# Patient Record
Sex: MALE | Race: WHITE | NOT HISPANIC OR LATINO | Employment: OTHER | URBAN - METROPOLITAN AREA
[De-identification: names, ages, dates, MRNs, and addresses within clinical notes are randomized per-mention and may not be internally consistent; named-entity substitution may affect disease eponyms.]

---

## 2017-01-03 ENCOUNTER — OFFICE VISIT (OUTPATIENT)
Dept: ENDOCRINOLOGY | Facility: CLINIC | Age: 32
End: 2017-01-03
Payer: MEDICARE

## 2017-01-03 VITALS
HEART RATE: 68 BPM | SYSTOLIC BLOOD PRESSURE: 130 MMHG | BODY MASS INDEX: 47.74 KG/M2 | DIASTOLIC BLOOD PRESSURE: 80 MMHG | HEIGHT: 68 IN | WEIGHT: 315 LBS

## 2017-01-03 DIAGNOSIS — E29.1 SECONDARY MALE HYPOGONADISM: Primary | ICD-10-CM

## 2017-01-03 DIAGNOSIS — E03.9 HYPOTHYROIDISM (ACQUIRED): ICD-10-CM

## 2017-01-03 DIAGNOSIS — E66.01 MORBID OBESITY WITH BMI OF 60.0-69.9, ADULT: ICD-10-CM

## 2017-01-03 DIAGNOSIS — G47.33 OBSTRUCTIVE SLEEP APNEA: ICD-10-CM

## 2017-01-03 PROCEDURE — 99204 OFFICE O/P NEW MOD 45 MIN: CPT | Mod: S$PBB,,, | Performed by: INTERNAL MEDICINE

## 2017-01-03 PROCEDURE — 99213 OFFICE O/P EST LOW 20 MIN: CPT | Mod: PBBFAC | Performed by: INTERNAL MEDICINE

## 2017-01-03 PROCEDURE — 99999 PR PBB SHADOW E&M-EST. PATIENT-LVL III: CPT | Mod: PBBFAC,,, | Performed by: INTERNAL MEDICINE

## 2017-01-03 NOTE — LETTER
January 3, 2017      Jose Paulino MD  [address] [place] [address]           [facility]  [address]  [place] [address]  Phone: [phone]  Fax: [phone]          Patient: Eder Carney   MR Number: [id number]   YOB: 1985   Date of Visit: 1/3/2017       Dear Dr. Jose Paulino:    Thank you for referring Eder Carney to me for evaluation. Attached you will find relevant portions of my assessment and plan of care.    If you have questions, please do not hesitate to call me. I look forward to following Eder Carney along with you.    Sincerely,    Bradley Springer MD    Enclosure  CC:  No Recipients    If you would like to receive this communication electronically, please contact [email or web address] or [phone] to request more information on [employer] Link access.    For providers and/or their staff who would like to refer a patient to [facility], please contact us through our one-stop-shop provider referral line, [facility], at [phone].    If you feel you have received this communication in error or would no longer like to receive these types of communications, please e-mail [email or web address]

## 2017-01-03 NOTE — PROGRESS NOTES
Subjective:      Patient ID: Eder Carney is a 31 y.o. male.    Chief Complaint:  Thyroid Problem    Referral from Dr. Jose Paulino    History of Present Illness  Mr presents for evaluation of male hypogonadism.     Has active history of BILLIE, chronic back pain, morbid obesity and hypothyroidism.    Developmental history:  Normal testis descent, puberty onset in early teens, first noticed body and facial hair in teenage years, and reached maximal adult height at age 17-18.    No family history of hypogonadism.  Normal sense of smell.     Denies recent severe/critical illness, no STD's, no orchitis, no testicular trauma or hx of orchiectomy, no radiation exposure.     Denies chronic steroid or opiate use. Denies use of marijuana. Denies use of lavender or tea tree oil. Denies use of anabolic steroids. Does not drink an excessive amount of EtOH.    Mood fluctuates. Has diagnosis of sleep apnea, but unable to tolerate CPAP mask as he can't sleep on his back due to chronic back pain.  Sleeps 4-6 hours a night. Denies excessive daytime sleepiness.    Libido is decreased. Difficult to get and maintain erections.  Doesn't get early AM spontaneous erections. No decrease in shaving frequency. No recent fractures. Denies hot flashes or night sweats.     No children. Not .    Denies breast tenderness or gynecomastia. No nipple discharge.  Occasional headaches. No decrease in peripheral vision.  No nausea/vomiting, weight loss or dizziness/lightheadedness. No history of head trauma.    Weight has increased since spine surgery in 2014, but has recently been stable. Has some increased facial roundness and redness. Has some stretch marks on abdomen.     Review of Systems   Constitutional: Negative for unexpected weight change.   Eyes: Negative for visual disturbance.   Respiratory: Negative for shortness of breath.    Cardiovascular: Negative for chest pain.   Gastrointestinal: Negative for abdominal pain.   Endocrine:  Negative for cold intolerance.   Genitourinary: Positive for frequency.   Skin: Negative for rash.   Neurological: Positive for headaches.       Objective:   Physical Exam   Constitutional: He is oriented to person, place, and time. He appears well-developed and well-nourished.   HENT:   Head: Normocephalic and atraumatic.   Right Ear: External ear normal.   Left Ear: External ear normal.   Nose: Nose normal.   Neck: No tracheal deviation present. No thyromegaly present.   Cardiovascular: Normal rate, regular rhythm and normal heart sounds.    + 1 pedal edema in lower ext   Pulmonary/Chest: Effort normal and breath sounds normal.   Abdominal: Soft. Bowel sounds are normal. There is no tenderness.   Genitourinary:   Genitourinary Comments: Testis 20 cc bilaterally without masses    Musculoskeletal:   Normal gait, no cyanosis or clubbing   Neurological: He is alert and oriented to person, place, and time. He has normal reflexes.   Vibration sense intact   Skin: Skin is warm and dry. No rash noted.   No nodules, no ulcers  Thin pearly striae on abdomen  Some dorsocervical and supraclavicular fullness     Psychiatric: He has a normal mood and affect. Judgment normal.   Vitals reviewed.      Lab Review:   Outside labs reviewed  9/1/2016: Prolactin 16.2 (slightly elevated), LH 3.0, FSH 2.8  8/2016: total T 150 (drawn at midday), free T 5 (drawn at midday)  9/2016: total T 124 (drawn in early AM)      Assessment:     1. Secondary male hypogonadism    2. Hypothyroidism (acquired)    3. Morbid obesity with BMI of 60.0-69.9, adult    4. Obstructive sleep apnea        Plan:     1.) Patient with secondary male hypogonadism  --Will order FSH, LH and early am testosterone panel to confirm  --Will also check prolactin and ferritin level  --Will need MRI of the pituitary to rule out adenoma or infiltrative dz  --Will also check CBC  --Denies LUTS  --If hypogonadism confirmed will likely start testosterone injections  --If workup  negative for an obvious cause of low T, the cause is most likely morbid obesity    2.) Clinically euthyroid. Patient reports last TSH was WNL.  --Continue levothyroxine 50 mcg PO daily    3.) Patient considering bariatric surgery  --Understands importance of weight loss but exercise capacity limited by back pain    4.) Patient with confirmed sleep apnea but unable to tolerate CPAP  --Advised patient that untreated severe sleep apnea is a relative contraindication to T therapy and must be monitored closely if T started as it could worsen the  BILLIE    Copy to Dr. Jose Springer M.D. Staff Endocrinology

## 2017-01-03 NOTE — MR AVS SNAPSHOT
[facility]  [address]  [facility] [address]  Phone: [phone]  Fax: [phone]                  Eder Carney   1/3/2017 11:00 AM   Office Visit    Description:  Male : 1985   Provider:  Bradley Springer MD   Department:  [facility]           Reason for Visit     Thyroid Problem           Diagnoses this Visit        Comments    Secondary male hypogonadism    -  Primary     Hypothyroidism (acquired)         Morbid obesity with BMI of 60.0-69.9, adult         Obstructive sleep apnea                To Do List           Future Appointments        Provider Department Dept Phone    2017 8:30 AM STA MRI1 [facility] [phone]    2017 10:20 AM LAB, [facility] [phone]      Goals (5 Years of Data)     None      [facility]     [facility] On Call Nurse Care Line -  Assistance  Registered nurses in the [facility] provide clinical advisement, health education, appointment booking, and other advisory services.  Call for this free service at [phone].             Medications           Message regarding Medications     Verify the changes and/or additions to your medication regime listed below are the same as discussed with your clinician today.  If any of these changes or additions are incorrect, please notify your healthcare provider.             Verify that the below list of medications is an accurate representation of the medications you are currently taking.  If none reported, the list may be blank. If incorrect, please contact your healthcare provider. Carry this list with you in case of emergency.           Current Medications     ascorbic acid, vitamin C, (VITAMIN C) 250 MG tablet Take 250 mg by mouth 2 (two) times daily.    doxepin (SINEQUAN) 10 MG capsule TAKE TWO capsules BY MOUTH nightly    MANFRED-TIME 325 mg (65 mg iron) Tab tablet Take 1 tablet(s) twice a day by oral  "route.    levothyroxine (SYNTHROID) 50 MCG tablet Take 1 tablet(s) every day by oral route for 30 days.           Clinical Reference Information           Vital Signs - Last Recorded  Most recent update: 1/3/2017 10:50 AM by Leslie Spangler MA    BP Pulse Ht Wt BMI    130/80 68 5' 8" (1.727 m) (!) 185.5 kg (408 lb 15.3 oz) 62.18 kg/m2      Blood Pressure          Most Recent Value    BP  130/80      Allergies as of 1/3/2017     Decadron [Dexamethasone Sodium Phosphate]      Immunizations Administered on Date of Encounter - 1/3/2017     None      Orders Placed During Today's Visit     Future Labs/Procedures Expected by Expires    CBC auto differential  1/3/2017 3/4/2018    FERRITIN  1/3/2017 3/4/2018    Follicle stimulating hormone  1/3/2017 3/4/2018    Luteinizing hormone  1/3/2017 3/4/2018    MRI Brain W WO Contrast  1/3/2017 1/3/2018    Prolactin  1/3/2017 3/4/2018    TESTOSTERONE PANEL  1/3/2017 3/4/2018      "

## 2017-01-05 ENCOUNTER — HOSPITAL ENCOUNTER (OUTPATIENT)
Dept: RADIOLOGY | Facility: HOSPITAL | Age: 32
Discharge: HOME | End: 2017-01-05
Attending: INTERNAL MEDICINE
Payer: MEDICARE

## 2017-01-05 DIAGNOSIS — E66.01 MORBID OBESITY WITH BMI OF 60.0-69.9, ADULT: ICD-10-CM

## 2017-01-05 DIAGNOSIS — G47.33 OBSTRUCTIVE SLEEP APNEA: ICD-10-CM

## 2017-01-05 DIAGNOSIS — E03.9 HYPOTHYROIDISM (ACQUIRED): ICD-10-CM

## 2017-01-05 DIAGNOSIS — E29.1 SECONDARY MALE HYPOGONADISM: ICD-10-CM

## 2017-01-05 PROCEDURE — 70553 MRI BRAIN STEM W/O & W/DYE: CPT | Mod: TC

## 2017-01-05 PROCEDURE — 70553 MRI BRAIN STEM W/O & W/DYE: CPT | Mod: 26,,, | Performed by: RADIOLOGY

## 2017-01-05 PROCEDURE — 25500020 PHARM REV CODE 255: Performed by: INTERNAL MEDICINE

## 2017-01-05 PROCEDURE — A9585 GADOBUTROL INJECTION: HCPCS | Performed by: INTERNAL MEDICINE

## 2017-01-05 RX ORDER — GADOBUTROL 604.72 MG/ML
10 INJECTION INTRAVENOUS
Status: COMPLETED | OUTPATIENT
Start: 2017-01-05 | End: 2017-01-05

## 2017-01-05 RX ADMIN — GADOBUTROL 10 ML: 604.72 INJECTION INTRAVENOUS at 09:01

## 2017-01-06 ENCOUNTER — TELEPHONE (OUTPATIENT)
Dept: ENDOCRINOLOGY | Facility: CLINIC | Age: 32
End: 2017-01-06

## 2017-01-06 DIAGNOSIS — D35.2 PITUITARY MICROADENOMA: Primary | ICD-10-CM

## 2017-01-06 NOTE — TELEPHONE ENCOUNTER
Called patient regarding results. Possible pituitary microadenoma. Will check ACTH, cortisol, IGF-1, TSH, FT4, 24 hr UFC and diurnal salivary cortisols. Awaiting testosterone panel.

## 2017-01-11 ENCOUNTER — TELEPHONE (OUTPATIENT)
Dept: ENDOCRINOLOGY | Facility: CLINIC | Age: 32
End: 2017-01-11

## 2017-01-11 DIAGNOSIS — E29.1 MALE HYPOGONADISM: Primary | ICD-10-CM

## 2017-01-11 RX ORDER — TESTOSTERONE CYPIONATE 200 MG/ML
200 INJECTION, SOLUTION INTRAMUSCULAR
Qty: 6 ML | Refills: 1 | Status: SHIPPED | OUTPATIENT
Start: 2017-01-11 | End: 2021-02-26

## 2017-01-11 NOTE — TELEPHONE ENCOUNTER
Left message with patient's mom for patient to call back to discuss this and other things patient needs from Dr Springer's phone note of 1/16/17

## 2017-01-11 NOTE — TELEPHONE ENCOUNTER
Patient called back.  Scheduled for 8:00am labs and testosterone injection teaching 1/17/16.  Will also give patient 24 hour urine jug and instructions and also cortisol saliva kits x 2.  Faxed rx for testosterone to patients pharmacy.  3 month labs scheduled after patient starts testosterone treatment.  Patient voiced understanding

## 2017-01-11 NOTE — TELEPHONE ENCOUNTER
Called patient with no answer. Will start IM testosterone at 200 mg every 2 weeks.  Will then need total testosterone and CBC checked 3 months after starting injections and follow up in 6 months. The 3 month labs should be checked midway between injections. He will need testosterone injection teaching which can be done the same day he comes in to  the urine jug and salivary cortisol kits.

## 2017-01-17 ENCOUNTER — CLINICAL SUPPORT (OUTPATIENT)
Dept: ENDOCRINOLOGY | Facility: CLINIC | Age: 32
End: 2017-01-17
Payer: MEDICARE

## 2017-01-17 ENCOUNTER — LAB VISIT (OUTPATIENT)
Dept: LAB | Facility: HOSPITAL | Age: 32
End: 2017-01-17
Attending: INTERNAL MEDICINE
Payer: MEDICARE

## 2017-01-17 DIAGNOSIS — D35.2 PITUITARY MICROADENOMA: ICD-10-CM

## 2017-01-17 LAB
CORTIS SERPL-MCNC: 10.6 UG/DL
T4 FREE SERPL-MCNC: 0.76 NG/DL
TSH SERPL DL<=0.005 MIU/L-ACNC: 3.04 UIU/ML

## 2017-01-17 PROCEDURE — 99999 PR PBB SHADOW E&M-EST. PATIENT-LVL I: CPT | Mod: PBBFAC,,,

## 2017-01-17 PROCEDURE — 99211 OFF/OP EST MAY X REQ PHY/QHP: CPT | Mod: PBBFAC

## 2017-01-17 NOTE — PROGRESS NOTES
Depo testosterone teaching, Patient supplied medication, unopened with pharmacy label intact, pt verbalized his sister will administer injections. Written instructions given.

## 2017-01-18 LAB — IGF-I SERPL-MCNC: 85 NG/ML

## 2017-01-20 LAB — ACTH PLAS-MCNC: 45 PG/ML

## 2017-01-25 ENCOUNTER — TELEPHONE (OUTPATIENT)
Dept: ENDOCRINOLOGY | Facility: CLINIC | Age: 32
End: 2017-01-25

## 2017-01-25 NOTE — TELEPHONE ENCOUNTER
----- Message from Shanika Zuniga sent at 1/25/2017 11:47 AM CST -----  Contact: Kamla  [phone]  [facility]  -   Rep   Called stating that an order was placed for a different type of syring from which the pt normal use  - call back number [phone]  Thanks,

## 2017-01-25 NOTE — TELEPHONE ENCOUNTER
Spoke with Kamla.  Could only get 22 G 1 1/2 inch needles for patient  to inject testosterone.  Told her that was close enough to original one prescribed.

## 2017-01-26 ENCOUNTER — TELEPHONE (OUTPATIENT)
Dept: ENDOCRINOLOGY | Facility: CLINIC | Age: 32
End: 2017-01-26

## 2017-01-26 NOTE — TELEPHONE ENCOUNTER
Called patient to discuss labs. No evidence of Cushing's disease. FT4 on lower end so will increase levothyroxine to 75 mcg daily. Will plan to check CBC, PSA and testosterone levels in 3 months. Follow up in 6 months. IGF-1 level was low, but this is likely due to assay error

## 2017-01-31 ENCOUNTER — TELEPHONE (OUTPATIENT)
Dept: ENDOCRINOLOGY | Facility: CLINIC | Age: 32
End: 2017-01-31

## 2017-01-31 NOTE — TELEPHONE ENCOUNTER
This is regarding testosterone injections.  Forwarded to our injection teaching nurse to call and advise

## 2017-01-31 NOTE — TELEPHONE ENCOUNTER
----- Message from Ariane Plasencia sent at 1/31/2017  1:29 PM CST -----  Contact: Ms Cason/   [facility]   [phone]  Ms Cason states calling to speak with nurse  Ms [place] need to verify patient syringe.  Patient states should be needle.  Patient need needle to take medication.   Please call Ms [facility]   [phone] for more detail info

## 2017-02-08 DIAGNOSIS — E03.9 HYPOTHYROIDISM, UNSPECIFIED TYPE: Primary | ICD-10-CM

## 2017-02-08 RX ORDER — LEVOTHYROXINE SODIUM 75 UG/1
75 TABLET ORAL DAILY
Qty: 90 TABLET | Refills: 3 | Status: SHIPPED | OUTPATIENT
Start: 2017-02-08 | End: 2021-02-26

## 2017-02-08 NOTE — TELEPHONE ENCOUNTER
----- Message from Katelin Traylor sent at 2/8/2017  9:28 AM CST -----  Contact: [facility]  Stated the patient informed them that he should be on a 75 mg Rx for levothyroxine (SYNTHROID) 50 MCG tablet    Stated that a new Rx is needed if changes has been made    Will like to confirm if changes have been made.    Please call [phone] (Phone)  [phone] (Fax)

## 2017-04-04 ENCOUNTER — LAB VISIT (OUTPATIENT)
Dept: LAB | Facility: HOSPITAL | Age: 32
End: 2017-04-04
Attending: INTERNAL MEDICINE
Payer: MEDICARE

## 2017-04-04 DIAGNOSIS — E29.1 MALE HYPOGONADISM: ICD-10-CM

## 2017-04-04 LAB
BASOPHILS # BLD AUTO: 0.01 K/UL
BASOPHILS NFR BLD: 0.1 %
DIFFERENTIAL METHOD: ABNORMAL
EOSINOPHIL # BLD AUTO: 0.3 K/UL
EOSINOPHIL NFR BLD: 2.5 %
ERYTHROCYTE [DISTWIDTH] IN BLOOD BY AUTOMATED COUNT: 15.4 %
HCT VFR BLD AUTO: 45.5 %
HGB BLD-MCNC: 14.3 G/DL
LYMPHOCYTES # BLD AUTO: 3.1 K/UL
LYMPHOCYTES NFR BLD: 25.8 %
MCH RBC QN AUTO: 24.8 PG
MCHC RBC AUTO-ENTMCNC: 31.4 %
MCV RBC AUTO: 79 FL
MONOCYTES # BLD AUTO: 1 K/UL
MONOCYTES NFR BLD: 8.4 %
NEUTROPHILS # BLD AUTO: 7.6 K/UL
NEUTROPHILS NFR BLD: 63.2 %
PLATELET # BLD AUTO: 276 K/UL
PMV BLD AUTO: 9.7 FL
RBC # BLD AUTO: 5.76 M/UL
WBC # BLD AUTO: 11.94 K/UL

## 2017-04-04 PROCEDURE — 85025 COMPLETE CBC W/AUTO DIFF WBC: CPT

## 2017-04-04 PROCEDURE — 36415 COLL VENOUS BLD VENIPUNCTURE: CPT

## 2017-04-04 PROCEDURE — 84403 ASSAY OF TOTAL TESTOSTERONE: CPT

## 2017-04-05 LAB — TESTOST SERPL-MCNC: 151 NG/DL

## 2017-04-13 ENCOUNTER — TELEPHONE (OUTPATIENT)
Dept: ENDOCRINOLOGY | Facility: CLINIC | Age: 32
End: 2017-04-13

## 2017-04-13 DIAGNOSIS — E29.1 SECONDARY MALE HYPOGONADISM: Primary | ICD-10-CM

## 2017-04-13 NOTE — TELEPHONE ENCOUNTER
Called patient regarding labs. T level low but done at the end of the injection so this level isn't accurate. He does feel better on the T. Will repeat testosterone in 3 months, but have advised him to check it 7-8 days after the injection this time.

## 2019-12-03 ENCOUNTER — LAB VISIT (OUTPATIENT)
Dept: LAB | Facility: HOSPITAL | Age: 34
End: 2019-12-03
Attending: INTERNAL MEDICINE
Payer: MEDICARE

## 2019-12-03 DIAGNOSIS — D64.9 ANEMIA: ICD-10-CM

## 2019-12-03 DIAGNOSIS — Z79.1 ENCOUNTER FOR LONG-TERM USE OF NON-STEROIDAL ANTI-INFLAMMATORY MEDICATION: Primary | ICD-10-CM

## 2019-12-03 DIAGNOSIS — E66.01 MORBID OBESITY: ICD-10-CM

## 2019-12-03 LAB
ALBUMIN SERPL BCP-MCNC: 2.9 G/DL (ref 3.5–5.2)
ALP SERPL-CCNC: 75 U/L (ref 55–135)
ALT SERPL W/O P-5'-P-CCNC: 17 U/L (ref 10–44)
ANION GAP SERPL CALC-SCNC: 6 MMOL/L (ref 8–16)
AST SERPL-CCNC: 12 U/L (ref 10–40)
BASOPHILS # BLD AUTO: 0.02 K/UL (ref 0–0.2)
BASOPHILS NFR BLD: 0.1 % (ref 0–1.9)
BILIRUB SERPL-MCNC: 0.2 MG/DL (ref 0.1–1)
BUN SERPL-MCNC: 16 MG/DL (ref 6–20)
CALCIUM SERPL-MCNC: 8.8 MG/DL (ref 8.7–10.5)
CHLORIDE SERPL-SCNC: 108 MMOL/L (ref 95–110)
CO2 SERPL-SCNC: 26 MMOL/L (ref 23–29)
CREAT SERPL-MCNC: 0.8 MG/DL (ref 0.5–1.4)
DIFFERENTIAL METHOD: ABNORMAL
EOSINOPHIL # BLD AUTO: 0.4 K/UL (ref 0–0.5)
EOSINOPHIL NFR BLD: 2.9 % (ref 0–8)
ERYTHROCYTE [DISTWIDTH] IN BLOOD BY AUTOMATED COUNT: 14.4 % (ref 11.5–14.5)
EST. GFR  (AFRICAN AMERICAN): >60 ML/MIN/1.73 M^2
EST. GFR  (NON AFRICAN AMERICAN): >60 ML/MIN/1.73 M^2
FERRITIN SERPL-MCNC: 243 NG/ML (ref 20–300)
GLUCOSE SERPL-MCNC: 98 MG/DL (ref 70–110)
HCT VFR BLD AUTO: 43.8 % (ref 40–54)
HGB BLD-MCNC: 13.5 G/DL (ref 14–18)
IMM GRANULOCYTES # BLD AUTO: 0.09 K/UL (ref 0–0.04)
IMM GRANULOCYTES NFR BLD AUTO: 0.6 % (ref 0–0.5)
INR PPP: 0.9 (ref 0.8–1.2)
IRON SERPL-MCNC: 32 UG/DL (ref 45–160)
LYMPHOCYTES # BLD AUTO: 3.3 K/UL (ref 1–4.8)
LYMPHOCYTES NFR BLD: 22.7 % (ref 18–48)
MCH RBC QN AUTO: 26 PG (ref 27–31)
MCHC RBC AUTO-ENTMCNC: 30.8 G/DL (ref 32–36)
MCV RBC AUTO: 84 FL (ref 82–98)
MONOCYTES # BLD AUTO: 1 K/UL (ref 0.3–1)
MONOCYTES NFR BLD: 6.6 % (ref 4–15)
NEUTROPHILS # BLD AUTO: 9.8 K/UL (ref 1.8–7.7)
NEUTROPHILS NFR BLD: 67.1 % (ref 38–73)
NRBC BLD-RTO: 0 /100 WBC
PLATELET # BLD AUTO: 260 K/UL (ref 150–350)
PMV BLD AUTO: 9.8 FL (ref 9.2–12.9)
POTASSIUM SERPL-SCNC: 4.2 MMOL/L (ref 3.5–5.1)
PROT SERPL-MCNC: 7 G/DL (ref 6–8.4)
PROTHROMBIN TIME: 9.9 SEC (ref 9–12.5)
RBC # BLD AUTO: 5.2 M/UL (ref 4.6–6.2)
RETICS/RBC NFR AUTO: 1.3 % (ref 0.4–2)
SATURATED IRON: 13 % (ref 20–50)
SODIUM SERPL-SCNC: 140 MMOL/L (ref 136–145)
TOTAL IRON BINDING CAPACITY: 240 UG/DL (ref 250–450)
TRANSFERRIN SERPL-MCNC: 162 MG/DL (ref 200–375)
WBC # BLD AUTO: 14.6 K/UL (ref 3.9–12.7)

## 2019-12-03 PROCEDURE — 85610 PROTHROMBIN TIME: CPT

## 2019-12-03 PROCEDURE — 85025 COMPLETE CBC W/AUTO DIFF WBC: CPT

## 2019-12-03 PROCEDURE — 83540 ASSAY OF IRON: CPT

## 2019-12-03 PROCEDURE — 80074 ACUTE HEPATITIS PANEL: CPT

## 2019-12-03 PROCEDURE — 85045 AUTOMATED RETICULOCYTE COUNT: CPT

## 2019-12-03 PROCEDURE — 36415 COLL VENOUS BLD VENIPUNCTURE: CPT

## 2019-12-03 PROCEDURE — 82728 ASSAY OF FERRITIN: CPT

## 2019-12-03 PROCEDURE — 80053 COMPREHEN METABOLIC PANEL: CPT

## 2019-12-04 LAB
HAV IGM SERPL QL IA: NEGATIVE
HBV CORE IGM SERPL QL IA: NEGATIVE
HBV SURFACE AG SERPL QL IA: NEGATIVE
HCV AB SERPL QL IA: NEGATIVE

## 2021-02-26 PROBLEM — J90 PLEURAL EFFUSION: Status: ACTIVE | Noted: 2021-02-26

## 2021-02-27 PROBLEM — D64.9 NORMOCYTIC ANEMIA: Status: ACTIVE | Noted: 2021-02-27

## 2021-02-27 PROBLEM — Z00.00 HEALTH CARE MAINTENANCE: Status: ACTIVE | Noted: 2021-02-27

## 2021-05-31 PROBLEM — Z00.00 HEALTH CARE MAINTENANCE: Status: RESOLVED | Noted: 2021-02-27 | Resolved: 2021-05-31
